# Patient Record
Sex: FEMALE | URBAN - METROPOLITAN AREA
[De-identification: names, ages, dates, MRNs, and addresses within clinical notes are randomized per-mention and may not be internally consistent; named-entity substitution may affect disease eponyms.]

---

## 2020-05-13 ENCOUNTER — NEW PATIENT (OUTPATIENT)
Dept: URBAN - METROPOLITAN AREA CLINIC 44 | Facility: CLINIC | Age: 35
End: 2020-05-13
Payer: COMMERCIAL

## 2020-05-13 DIAGNOSIS — E11.3592 DIABETES MELLITUS TYPE 2 WITH PROLIFERATIVE RETINO: ICD-10-CM

## 2020-05-13 DIAGNOSIS — Z79.4 LONG TERM (CURRENT) USE OF INSULIN: ICD-10-CM

## 2020-05-13 DIAGNOSIS — E11.3511 DIABETES MELLITUS TYPE 2 WITH PROLIFERATIVE RETINO: Primary | ICD-10-CM

## 2020-05-13 PROCEDURE — 92004 COMPRE OPH EXAM NEW PT 1/>: CPT | Performed by: OPTOMETRIST

## 2020-05-13 PROCEDURE — 92134 CPTRZ OPH DX IMG PST SGM RTA: CPT | Performed by: OPTOMETRIST

## 2020-05-13 ASSESSMENT — INTRAOCULAR PRESSURE
OD: 14
OS: 13

## 2020-05-13 ASSESSMENT — VISUAL ACUITY
OD: 20/30
OS: 20/20

## 2021-12-20 ENCOUNTER — OFFICE VISIT (OUTPATIENT)
Dept: URBAN - METROPOLITAN AREA CLINIC 44 | Facility: CLINIC | Age: 36
End: 2021-12-20
Payer: COMMERCIAL

## 2021-12-20 DIAGNOSIS — H43.11 VITREOUS HEMORRHAGE, RIGHT EYE: ICD-10-CM

## 2021-12-20 DIAGNOSIS — E11.3492 DIABETES MELLITUS TYPE 2 WITH SEVERE NON-PROLIFERATIVE RETINOPATHY WITHOUT MACULAR EDEMA, LEFT EYE: ICD-10-CM

## 2021-12-20 DIAGNOSIS — H52.12 MYOPIA, LEFT EYE: ICD-10-CM

## 2021-12-20 DIAGNOSIS — H25.13 AGE-RELATED NUCLEAR CATARACT, BILATERAL: Primary | ICD-10-CM

## 2021-12-20 DIAGNOSIS — H52.4 PRESBYOPIA: ICD-10-CM

## 2021-12-20 PROCEDURE — 99214 OFFICE O/P EST MOD 30 MIN: CPT | Performed by: OPTOMETRIST

## 2021-12-20 PROCEDURE — 92134 CPTRZ OPH DX IMG PST SGM RTA: CPT | Performed by: OPTOMETRIST

## 2021-12-20 ASSESSMENT — KERATOMETRY: OS: 44.75

## 2021-12-20 ASSESSMENT — VISUAL ACUITY: OS: 20/20

## 2021-12-20 ASSESSMENT — INTRAOCULAR PRESSURE
OS: 12
OD: 13

## 2021-12-20 NOTE — IMPRESSION/PLAN
Impression: Myopia, left eye: H52.12. Plan: Explained to patient that when blood sugar is poorly controlled, it is not ideal to release new spec rx. However, patient does need some correction in order to function. Release new spec rx (balance OD -- unable to refract through vitreous hemorrhage). Vision may change if A1c/FBS improve.

## 2021-12-20 NOTE — IMPRESSION/PLAN
Impression: Vitreous hemorrhage, right eye: H43.11. Plan: Vitreous hemorrhage secondary to PDR x 1 month. Poor blood sugar control. Retina grossly attached but poor peripheral views. See retina.

## 2021-12-20 NOTE — IMPRESSION/PLAN
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 Plan: Severe NPDR w/o DME OS. No obvious PDR. Poor blood sugar control. Send report to PCP. Refer to Retina.